# Patient Record
Sex: FEMALE
[De-identification: names, ages, dates, MRNs, and addresses within clinical notes are randomized per-mention and may not be internally consistent; named-entity substitution may affect disease eponyms.]

---

## 2022-01-16 ENCOUNTER — NURSE TRIAGE (OUTPATIENT)
Dept: OTHER | Facility: CLINIC | Age: 22
End: 2022-01-16

## 2022-01-16 NOTE — TELEPHONE ENCOUNTER
Subjective: Caller states \"I am having COVID symptoms \"     Current Symptoms:   +feeling feverish   +sore throat   +muscle aches  +headaches   +Abdominal pain -\"all over pain\"- comes and goes   +burning with urination  +urinary frequency   -denies flank pain or blood in urine    +concerns of a UTI   -denies shortness of breath , feeling dizzy or lightheaded   -denies chest pain or chest pressure   +fully vaccinated for COVID   +no high risk medical problems     Onset: 2 days ago; sudden    Associated Symptoms: reduced appetite, reduced fluid intake    Pain Severity: 5/10; sharp,stabbing; constant-back pain     Temperature: Denies     What has been tried: no medications or interventions     LMP: December  Pregnant: No    Recommended disposition: See PCP in 24 hours- Advised to follow up in [facility] if no appointments available. Care advice provided, patient verbalizes understanding; denies any other questions or concerns; instructed to call back for any new or worsening symptoms. Patient/caller to follow-up with PCP     Attention Provider: Thank you for allowing me to participate in the care of your patient. The patient was connected to triage in response to symptoms provided. Please do not respond through this encounter as the response is not directed to a shared pool.     Reason for Disposition   [1] COVID-19 infection suspected by caller or triager AND [2] mild symptoms (cough, fever, or others) AND [3] has not gotten tested yet   Urinating more frequently than usual (i.e., frequency)    Protocols used: CORONAVIRUS (COVID-19) DIAGNOSED OR SUSPECTED-ADULT-[facility]

## 2023-04-28 ENCOUNTER — HOSPITAL ENCOUNTER (EMERGENCY)
Age: 23
Discharge: HOME | End: 2023-04-28
Attending: EMERGENCY MEDICINE
Payer: COMMERCIAL

## 2023-04-28 VITALS
RESPIRATION RATE: 18 BRPM | TEMPERATURE: 97.7 F | WEIGHT: 235.4 LBS | SYSTOLIC BLOOD PRESSURE: 116 MMHG | BODY MASS INDEX: 32.96 KG/M2 | OXYGEN SATURATION: 99 % | HEART RATE: 72 BPM | HEIGHT: 71 IN | DIASTOLIC BLOOD PRESSURE: 71 MMHG

## 2023-04-28 DIAGNOSIS — R11.0 NAUSEA: ICD-10-CM

## 2023-04-28 DIAGNOSIS — R51.9 ACUTE NONINTRACTABLE HEADACHE, UNSPECIFIED HEADACHE TYPE: Primary | ICD-10-CM

## 2023-04-28 PROCEDURE — 96375 TX/PRO/DX INJ NEW DRUG ADDON: CPT

## 2023-04-28 PROCEDURE — 6360000002 HC RX W HCPCS: Performed by: EMERGENCY MEDICINE

## 2023-04-28 PROCEDURE — 96361 HYDRATE IV INFUSION ADD-ON: CPT

## 2023-04-28 PROCEDURE — 2580000003 HC RX 258: Performed by: EMERGENCY MEDICINE

## 2023-04-28 PROCEDURE — 96374 THER/PROPH/DIAG INJ IV PUSH: CPT

## 2023-04-28 PROCEDURE — 99284 EMERGENCY DEPT VISIT MOD MDM: CPT

## 2023-04-28 RX ORDER — NARATRIPTAN 2.5 MG/1
TABLET ORAL
COMMUNITY
Start: 2023-03-30

## 2023-04-28 RX ORDER — HYDROXYZINE HYDROCHLORIDE 10 MG/1
1 TABLET, FILM COATED ORAL 3 TIMES DAILY PRN
COMMUNITY
Start: 2021-07-28

## 2023-04-28 RX ORDER — KETOROLAC TROMETHAMINE 10 MG/1
10 TABLET, FILM COATED ORAL
Qty: 9 TABLET | Refills: 0 | Status: SHIPPED | OUTPATIENT
Start: 2023-04-28 | End: 2023-05-01

## 2023-04-28 RX ORDER — FLUOXETINE HYDROCHLORIDE 20 MG/1
20 CAPSULE ORAL DAILY
COMMUNITY
Start: 2021-09-30

## 2023-04-28 RX ORDER — PROPRANOLOL HYDROCHLORIDE 20 MG/1
5 TABLET ORAL 3 TIMES DAILY
COMMUNITY

## 2023-04-28 RX ORDER — MULTIVITAMIN
1 TABLET ORAL DAILY
COMMUNITY

## 2023-04-28 RX ORDER — BUPROPION HYDROCHLORIDE 300 MG/1
300 TABLET ORAL DAILY
COMMUNITY
Start: 2022-04-08

## 2023-04-28 RX ORDER — PROMETHAZINE HYDROCHLORIDE 25 MG/ML
12.5 INJECTION, SOLUTION INTRAMUSCULAR; INTRAVENOUS ONCE
Status: COMPLETED | OUTPATIENT
Start: 2023-04-28 | End: 2023-04-28

## 2023-04-28 RX ORDER — CYCLOBENZAPRINE HCL 5 MG
TABLET ORAL
COMMUNITY
Start: 2022-04-06

## 2023-04-28 RX ORDER — LORATADINE 10 MG/1
CAPSULE, LIQUID FILLED ORAL DAILY
COMMUNITY

## 2023-04-28 RX ORDER — 0.9 % SODIUM CHLORIDE 0.9 %
500 INTRAVENOUS SOLUTION INTRAVENOUS ONCE
Status: COMPLETED | OUTPATIENT
Start: 2023-04-28 | End: 2023-04-28

## 2023-04-28 RX ORDER — KETOROLAC TROMETHAMINE 15 MG/ML
15 INJECTION, SOLUTION INTRAMUSCULAR; INTRAVENOUS ONCE
Status: COMPLETED | OUTPATIENT
Start: 2023-04-28 | End: 2023-04-28

## 2023-04-28 RX ORDER — ATOGEPANT 60 MG/1
1 TABLET ORAL NIGHTLY
COMMUNITY
Start: 2022-04-06

## 2023-04-28 RX ORDER — BUSPIRONE HYDROCHLORIDE 7.5 MG/1
7.5 TABLET ORAL DAILY
COMMUNITY
Start: 2023-01-30

## 2023-04-28 RX ORDER — TRAZODONE HYDROCHLORIDE 50 MG/1
TABLET ORAL
COMMUNITY
Start: 2023-01-29

## 2023-04-28 RX ORDER — DIPHENHYDRAMINE HYDROCHLORIDE 50 MG/ML
12.5 INJECTION INTRAMUSCULAR; INTRAVENOUS ONCE
Status: COMPLETED | OUTPATIENT
Start: 2023-04-28 | End: 2023-04-28

## 2023-04-28 RX ADMIN — KETOROLAC TROMETHAMINE 15 MG: 15 INJECTION, SOLUTION INTRAMUSCULAR; INTRAVENOUS at 17:25

## 2023-04-28 RX ADMIN — DIPHENHYDRAMINE HYDROCHLORIDE 12.5 MG: 50 INJECTION, SOLUTION INTRAMUSCULAR; INTRAVENOUS at 17:24

## 2023-04-28 RX ADMIN — PROMETHAZINE HYDROCHLORIDE 12.5 MG: 25 INJECTION INTRAMUSCULAR; INTRAVENOUS at 17:25

## 2023-04-28 RX ADMIN — SODIUM CHLORIDE 500 ML: 9 INJECTION, SOLUTION INTRAVENOUS at 17:29

## 2023-04-28 ASSESSMENT — PAIN SCALES - GENERAL
PAINLEVEL_OUTOF10: 0
PAINLEVEL_OUTOF10: 10

## 2023-04-28 ASSESSMENT — PAIN DESCRIPTION - LOCATION
LOCATION: HEAD
LOCATION: HEAD

## 2023-04-28 ASSESSMENT — PAIN - FUNCTIONAL ASSESSMENT: PAIN_FUNCTIONAL_ASSESSMENT: NONE - DENIES PAIN

## 2023-04-28 NOTE — ED PROVIDER NOTES
[id number] [address] ENCOUNTER        Pt Name: Marielena Terry  MRN: [id number]  [place] [address]  Date of evaluation: 4/28/2023  Provider: Chelle Reddy MD  PCP: No primary care provider on file. CHIEF COMPLAINT       Chief Complaint   Patient presents with    Headache     Migraine x1 week. Hx: of migraines. Took rescue medication but no relief        HISTORY OFPRESENT ILLNESS   (Location/Symptom, Timing/Onset, Context/Setting, Quality, Duration, Modifying Factors,Severity)  Note limiting factors. Marielena Terry is a 25 y.o. female presenting today due to concern for having a headache for the last week similar to prior migraines although not improving with medication at home. She does feel nauseated but denies any vomiting. She does have photophobia. No neck pain or stiffness. No syncope. No numbness or weakness in the arms or legs. No fever. No chest pain or shortness of breath. No abdominal pain. She does have a neurologist that she follows up with but does report being out of the medication related to her chronic migraines. Last menstrual cycle was April 10, 2023 and she denies any concern with being pregnant. She is currently with her mother and did give verbal consent to ask questions in front of her. Due to concern for chronic headaches and reporting this feels just like her prior migraines just not improving at home, she came to the ED for further evaluation. She states that normally Phenergan along with Benadryl and Toradol will help with her headache. She denies any family history of brain aneurysms. One of her grandparents does have a history of migraines. Again, this feels like a typical headache and not the worst headache of her life. It did build up over time and did not come on suddenly.          REVIEW OF SYSTEMS    (2-9 systems for level 4, 10 or more for level 5)     Review of Systems   Constitutional:  Negative for appetite change,

## 2023-04-28 NOTE — DISCHARGE INSTRUCTIONS
Take Tylenol or Toradol as needed for return of headache to see if this helps. Follow-up with your neurologist over the next week for any other concerns. Return to the emergency department immediately for worsening headache that is different than your typical headaches, nonstop vomiting, fever, severe neck stiffness, new numbness or weakness on one side of the body, or any other concerns for further evaluation.

## 2023-04-29 ASSESSMENT — ENCOUNTER SYMPTOMS
NAUSEA: 1
VOMITING: 0
BACK PAIN: 0
SHORTNESS OF BREATH: 0
ABDOMINAL PAIN: 0
SINUS PRESSURE: 0
COLOR CHANGE: 0
PHOTOPHOBIA: 1

## 2023-07-17 ENCOUNTER — HOSPITAL ENCOUNTER (EMERGENCY)
Age: 23
Discharge: HOME | End: 2023-07-18
Attending: STUDENT IN AN ORGANIZED HEALTH CARE EDUCATION/TRAINING PROGRAM
Payer: COMMERCIAL

## 2023-07-17 VITALS
OXYGEN SATURATION: 100 % | TEMPERATURE: 98.7 F | BODY MASS INDEX: 32.58 KG/M2 | SYSTOLIC BLOOD PRESSURE: 131 MMHG | RESPIRATION RATE: 14 BRPM | DIASTOLIC BLOOD PRESSURE: 71 MMHG | WEIGHT: 232.7 LBS | HEART RATE: 91 BPM | HEIGHT: 71 IN

## 2023-07-17 DIAGNOSIS — R51.9 ACUTE NONINTRACTABLE HEADACHE, UNSPECIFIED HEADACHE TYPE: Primary | ICD-10-CM

## 2023-07-17 LAB — HCG UR QL: NEGATIVE

## 2023-07-17 PROCEDURE — 96372 THER/PROPH/DIAG INJ SC/IM: CPT

## 2023-07-17 PROCEDURE — 84703 CHORIONIC GONADOTROPIN ASSAY: CPT

## 2023-07-17 PROCEDURE — 6370000000 HC RX 637 (ALT 250 FOR IP): Performed by: STUDENT IN AN ORGANIZED HEALTH CARE EDUCATION/TRAINING PROGRAM

## 2023-07-17 PROCEDURE — 6360000002 HC RX W HCPCS: Performed by: STUDENT IN AN ORGANIZED HEALTH CARE EDUCATION/TRAINING PROGRAM

## 2023-07-17 PROCEDURE — 99284 EMERGENCY DEPT VISIT MOD MDM: CPT

## 2023-07-17 RX ORDER — ONDANSETRON 4 MG/1
4 TABLET, ORALLY DISINTEGRATING ORAL ONCE
Status: COMPLETED | OUTPATIENT
Start: 2023-07-18 | End: 2023-07-17

## 2023-07-17 RX ORDER — 0.9 % SODIUM CHLORIDE 0.9 %
1000 INTRAVENOUS SOLUTION INTRAVENOUS ONCE
Status: DISCONTINUED | OUTPATIENT
Start: 2023-07-17 | End: 2023-07-17

## 2023-07-17 RX ORDER — KETOROLAC TROMETHAMINE 15 MG/ML
15 INJECTION, SOLUTION INTRAMUSCULAR; INTRAVENOUS ONCE
Status: DISCONTINUED | OUTPATIENT
Start: 2023-07-17 | End: 2023-07-17

## 2023-07-17 RX ORDER — PROCHLORPERAZINE EDISYLATE 5 MG/ML
10 INJECTION INTRAMUSCULAR; INTRAVENOUS ONCE
Status: DISCONTINUED | OUTPATIENT
Start: 2023-07-17 | End: 2023-07-17

## 2023-07-17 RX ORDER — BUTALBITAL, ACETAMINOPHEN AND CAFFEINE 50; 325; 40 MG/1; MG/1; MG/1
1 TABLET ORAL ONCE
Status: COMPLETED | OUTPATIENT
Start: 2023-07-18 | End: 2023-07-17

## 2023-07-17 RX ORDER — DIPHENHYDRAMINE HYDROCHLORIDE 50 MG/ML
12.5 INJECTION INTRAMUSCULAR; INTRAVENOUS ONCE
Status: DISCONTINUED | OUTPATIENT
Start: 2023-07-17 | End: 2023-07-17

## 2023-07-17 RX ORDER — KETOROLAC TROMETHAMINE 30 MG/ML
30 INJECTION, SOLUTION INTRAMUSCULAR; INTRAVENOUS ONCE
Status: COMPLETED | OUTPATIENT
Start: 2023-07-18 | End: 2023-07-17

## 2023-07-17 RX ADMIN — BUTALBITAL, ACETAMINOPHEN, AND CAFFEINE 1 TABLET: 50; 325; 40 TABLET ORAL at 23:54

## 2023-07-17 RX ADMIN — KETOROLAC TROMETHAMINE 30 MG: 30 INJECTION, SOLUTION INTRAMUSCULAR; INTRAVENOUS at 23:54

## 2023-07-17 RX ADMIN — ONDANSETRON 4 MG: 4 TABLET, ORALLY DISINTEGRATING ORAL at 23:55

## 2023-07-17 ASSESSMENT — PAIN SCALES - GENERAL
PAINLEVEL_OUTOF10: 8
PAINLEVEL_OUTOF10: 8

## 2023-07-17 ASSESSMENT — PAIN DESCRIPTION - LOCATION: LOCATION: HEAD

## 2023-07-17 ASSESSMENT — PAIN DESCRIPTION - FREQUENCY: FREQUENCY: CONTINUOUS

## 2023-07-17 ASSESSMENT — PAIN DESCRIPTION - ORIENTATION: ORIENTATION: ANTERIOR

## 2023-07-17 ASSESSMENT — PAIN DESCRIPTION - DESCRIPTORS: DESCRIPTORS: STABBING

## 2023-07-17 ASSESSMENT — PAIN DESCRIPTION - PAIN TYPE: TYPE: ACUTE PAIN

## 2023-07-17 ASSESSMENT — PAIN - FUNCTIONAL ASSESSMENT: PAIN_FUNCTIONAL_ASSESSMENT: 0-10

## 2023-07-18 RX ORDER — KETOROLAC TROMETHAMINE 10 MG/1
10 TABLET, FILM COATED ORAL EVERY 8 HOURS PRN
Qty: 20 TABLET | Refills: 0 | Status: SHIPPED | OUTPATIENT
Start: 2023-07-18 | End: 2024-07-17

## 2023-07-18 NOTE — ED PROVIDER NOTES
[address] ENCOUNTER        Patient Name: Pao Adam  MRN: [id number]  [address] [facility] [address]  Date of evaluation: 7/17/2023  Provider: Mary Lang MD  PCP: No primary care provider on file. Note Started: 10:18 PM EDT 7/17/23      CHIEF COMPLAINT  Migraine         HISTORY & PHYSICAL     HISTORY OF PRESENT ILLNESS  History from : Patient    Limitations to history : None    Pao Adam is a 25 y.o. female  has a past medical history of Headache., who presents to the ED complaining of headache. Onset of pain: 4d, no incitying event, no trauma or blood thinners  Location: frontal, top  Radiation: denies  Quality: stabbing  Severity: 8/10  Timing: constant but waxing and waning severity  Palliative Factors: denies  Provocative Factors: denies  Feels like her typical migraine    Associated symptoms: denies numbness, weakness, tingling. Old records reviewed:  Patient has been previously seen in the emergency department for headaches    No other complaints, modifying factors or associated symptoms. Nursing Notes were all reviewed and agreed with or any disagreements were addressed in the HPI. I have reviewed the following from the nursing documentation. Past Medical History:   Diagnosis Date    Headache      History reviewed. No pertinent surgical history. History reviewed. No pertinent family history.   Social History     Socioeconomic History    Marital status: Single     Spouse name: Not on file    Number of children: Not on file    Years of education: Not on file    Highest education level: Not on file   Occupational History    Not on file   Tobacco Use    Smoking status: Never    Smokeless tobacco: Never   Substance and Sexual Activity    Alcohol use: Yes     Comment: occ    Drug use: Never    Sexual activity: Yes   Other Topics Concern    Not on file   Social History Narrative    Not on file     Social Determinants of Health     Financial Resource

## 2023-07-18 NOTE — ED NOTES
Pt dc/d with instructions in stable condition, ambulatory to lobby. Home per ride.      Azra Soria RN  07/18/23 [id number]

## 2025-07-21 ENCOUNTER — HOSPITAL ENCOUNTER (EMERGENCY)
Age: 25
Discharge: HOME | End: 2025-07-21
Attending: STUDENT IN AN ORGANIZED HEALTH CARE EDUCATION/TRAINING PROGRAM
Payer: COMMERCIAL

## 2025-07-21 VITALS
DIASTOLIC BLOOD PRESSURE: 71 MMHG | WEIGHT: 273 LBS | OXYGEN SATURATION: 100 % | TEMPERATURE: 98.2 F | BODY MASS INDEX: 38.08 KG/M2 | RESPIRATION RATE: 17 BRPM | SYSTOLIC BLOOD PRESSURE: 122 MMHG | HEART RATE: 81 BPM

## 2025-07-21 DIAGNOSIS — G43.909 MIGRAINE WITHOUT STATUS MIGRAINOSUS, NOT INTRACTABLE, UNSPECIFIED MIGRAINE TYPE: Primary | ICD-10-CM

## 2025-07-21 PROCEDURE — 96375 TX/PRO/DX INJ NEW DRUG ADDON: CPT

## 2025-07-21 PROCEDURE — 2580000003 HC RX 258: Performed by: STUDENT IN AN ORGANIZED HEALTH CARE EDUCATION/TRAINING PROGRAM

## 2025-07-21 PROCEDURE — 96374 THER/PROPH/DIAG INJ IV PUSH: CPT

## 2025-07-21 PROCEDURE — 99284 EMERGENCY DEPT VISIT MOD MDM: CPT

## 2025-07-21 PROCEDURE — 6360000002 HC RX W HCPCS: Performed by: STUDENT IN AN ORGANIZED HEALTH CARE EDUCATION/TRAINING PROGRAM

## 2025-07-21 RX ORDER — DIPHENHYDRAMINE HYDROCHLORIDE 50 MG/ML
25 INJECTION, SOLUTION INTRAMUSCULAR; INTRAVENOUS ONCE
Status: COMPLETED | OUTPATIENT
Start: 2025-07-21 | End: 2025-07-21

## 2025-07-21 RX ORDER — BUTALBITAL, ACETAMINOPHEN AND CAFFEINE 300; 40; 50 MG/1; MG/1; MG/1
1 CAPSULE ORAL EVERY 4 HOURS PRN
Qty: 20 CAPSULE | Refills: 0 | Status: SHIPPED | OUTPATIENT
Start: 2025-07-21

## 2025-07-21 RX ORDER — 0.9 % SODIUM CHLORIDE 0.9 %
1000 INTRAVENOUS SOLUTION INTRAVENOUS ONCE
Status: COMPLETED | OUTPATIENT
Start: 2025-07-21 | End: 2025-07-21

## 2025-07-21 RX ORDER — ONDANSETRON 4 MG/1
4 TABLET, ORALLY DISINTEGRATING ORAL 3 TIMES DAILY PRN
Qty: 21 TABLET | Refills: 0 | Status: SHIPPED | OUTPATIENT
Start: 2025-07-21

## 2025-07-21 RX ORDER — KETOROLAC TROMETHAMINE 30 MG/ML
15 INJECTION, SOLUTION INTRAMUSCULAR; INTRAVENOUS ONCE
Status: COMPLETED | OUTPATIENT
Start: 2025-07-21 | End: 2025-07-21

## 2025-07-21 RX ORDER — PROCHLORPERAZINE EDISYLATE 5 MG/ML
5 INJECTION INTRAMUSCULAR; INTRAVENOUS ONCE
Status: DISCONTINUED | OUTPATIENT
Start: 2025-07-21 | End: 2025-07-21

## 2025-07-21 RX ORDER — ONDANSETRON 2 MG/ML
4 INJECTION INTRAMUSCULAR; INTRAVENOUS ONCE
Status: COMPLETED | OUTPATIENT
Start: 2025-07-21 | End: 2025-07-21

## 2025-07-21 RX ADMIN — SODIUM CHLORIDE 1000 ML: 0.9 INJECTION, SOLUTION INTRAVENOUS at 14:06

## 2025-07-21 RX ADMIN — DIPHENHYDRAMINE HYDROCHLORIDE 25 MG: 50 INJECTION INTRAMUSCULAR; INTRAVENOUS at 14:09

## 2025-07-21 RX ADMIN — ONDANSETRON 4 MG: 2 INJECTION, SOLUTION INTRAMUSCULAR; INTRAVENOUS at 14:08

## 2025-07-21 RX ADMIN — KETOROLAC TROMETHAMINE 15 MG: 30 INJECTION, SOLUTION INTRAMUSCULAR at 14:10

## 2025-07-21 ASSESSMENT — PAIN SCALES - GENERAL
PAINLEVEL_OUTOF10: 7
PAINLEVEL_OUTOF10: 7

## 2025-07-21 ASSESSMENT — PAIN - FUNCTIONAL ASSESSMENT: PAIN_FUNCTIONAL_ASSESSMENT: 0-10

## 2025-07-21 ASSESSMENT — PAIN DESCRIPTION - ORIENTATION: ORIENTATION: MID

## 2025-07-21 ASSESSMENT — ENCOUNTER SYMPTOMS
PHOTOPHOBIA: 1
NAUSEA: 1
VOMITING: 1

## 2025-07-21 ASSESSMENT — PAIN DESCRIPTION - DESCRIPTORS: DESCRIPTORS: ACHING

## 2025-07-21 ASSESSMENT — PAIN DESCRIPTION - LOCATION: LOCATION: HEAD

## 2025-07-21 NOTE — ED NOTES
Pt provided with discharge paperwork. Pt educated on follow up and medications. Pt verbalized understanding. Pt ambulated out of ER with steady gait.

## 2025-07-21 NOTE — ED PROVIDER NOTES
Emergency Department Provider Note  Location: [facility] EMERGENCY DEPARTMENT  7/21/2025     Patient Identification  Susan Gottlieb is a 24 y.o. female    Chief Complaint  Migraine (Migraine starting today. N/v. Took excedrin at home with minimal relief. 7/10 pain)      Mode of Arrival  private car    HPI  (History provided by patient)  This is a 24 y.o. female with a PMH significant for migraines presented today for headache.  Symptoms started today.  States that she took an Excedrin at home with no significant relief.  She has been well-controlled with her migraines in the past and followed by neurology.  States she has not had a migraine for roughly a year.  She endorsing mainly right-sided headache described as throbbing with associated photophobia and phonophobia.  She is endorsing nausea and nonbilious nonbloody emesis.  No associated fever.  Denies any head trauma.  Denies any chest pain or shortness of breath.      ROS  Review of Systems   Eyes:  Positive for photophobia.   Gastrointestinal:  Positive for nausea and vomiting.   Neurological:  Positive for headaches.   All other systems reviewed and are negative.        I have reviewed the following nursing documentation:  Allergies:   Allergies   Allergen Reactions    Amoxicillin Hives    Ubrelvy [Ubrogepant] Other (See Comments)       Past medical history:  has a past medical history of Headache.    Past surgical history:  has no past surgical history on file.    Home medications:   Prior to Admission medications    Medication Sig Start Date End Date Taking? Authorizing Provider   butalbital-APAP-caffeine (FIORICET) -40 MG CAPS per capsule Take 1 capsule by mouth every 4 hours as needed for Headaches or Migraine 7/21/25  Yes Julianna Thrasher MD   ondansetron (ZOFRAN-ODT) 4 MG disintegrating tablet Take 1 tablet by mouth 3 times daily as needed for Nausea or Vomiting 7/21/25  Yes Julianna Thrasher MD   ketorolac (TORADOL) 10 MG tablet Take 1 
"I'm feeling sick"

## 2025-07-21 NOTE — DISCHARGE INSTRUCTIONS
You were seen in the Emergency Department for a migraine with aura. Your symptoms improved following the administration of toradol, benadryl, zofran. Please follow up with your PCP within the next three days to inform them that you have been seen in the ED for migraine. Please return to the ED if you experience worsening of your symptoms, if you experience worsening fatigue, if you notice unilateral weakness or numbness, or if your headache pain becomes worse than what is typical for your migraines.     You are being sent home with prescriptions for zofran 4 mg oral dissolving tablets. Please place one tablet on your tongue every 8 hours as needed for nausea. You are also being sent home with a prescription for Fioricet, take one pill every four hours as needed for headache pain.